# Patient Record
Sex: MALE | URBAN - METROPOLITAN AREA
[De-identification: names, ages, dates, MRNs, and addresses within clinical notes are randomized per-mention and may not be internally consistent; named-entity substitution may affect disease eponyms.]

---

## 2022-12-08 ENCOUNTER — OUT OF OFFICE VISIT (OUTPATIENT)
Dept: URBAN - METROPOLITAN AREA MEDICAL CENTER 27 | Facility: MEDICAL CENTER | Age: 57
End: 2022-12-08
Payer: COMMERCIAL

## 2022-12-08 DIAGNOSIS — K85.80 ACUTE VIRAL PANCREATITIS: ICD-10-CM

## 2022-12-08 PROCEDURE — G8427 DOCREV CUR MEDS BY ELIG CLIN: HCPCS | Performed by: INTERNAL MEDICINE

## 2022-12-08 PROCEDURE — 99222 1ST HOSP IP/OBS MODERATE 55: CPT | Performed by: INTERNAL MEDICINE

## 2022-12-09 ENCOUNTER — OUT OF OFFICE VISIT (OUTPATIENT)
Dept: URBAN - METROPOLITAN AREA MEDICAL CENTER 27 | Facility: MEDICAL CENTER | Age: 57
End: 2022-12-09
Payer: COMMERCIAL

## 2022-12-09 DIAGNOSIS — K85.80 ACUTE VIRAL PANCREATITIS: ICD-10-CM

## 2022-12-09 DIAGNOSIS — K80.20 ASYMPTOMATIC CHOLELITHIASIS: ICD-10-CM

## 2022-12-09 DIAGNOSIS — K76.0 FATTY (CHANGE OF) LIVER: ICD-10-CM

## 2022-12-09 PROCEDURE — 99232 SBSQ HOSP IP/OBS MODERATE 35: CPT | Performed by: INTERNAL MEDICINE

## 2024-06-05 ENCOUNTER — DASHBOARD ENCOUNTERS (OUTPATIENT)
Age: 59
End: 2024-06-05

## 2024-06-05 ENCOUNTER — OFFICE VISIT (OUTPATIENT)
Dept: URBAN - METROPOLITAN AREA CLINIC 12 | Facility: CLINIC | Age: 59
End: 2024-06-05
Payer: COMMERCIAL

## 2024-06-05 ENCOUNTER — LAB OUTSIDE AN ENCOUNTER (OUTPATIENT)
Dept: URBAN - METROPOLITAN AREA CLINIC 12 | Facility: CLINIC | Age: 59
End: 2024-06-05

## 2024-06-05 VITALS
SYSTOLIC BLOOD PRESSURE: 133 MMHG | HEART RATE: 84 BPM | BODY MASS INDEX: 25.03 KG/M2 | HEIGHT: 69 IN | WEIGHT: 169 LBS | DIASTOLIC BLOOD PRESSURE: 79 MMHG | TEMPERATURE: 98.1 F

## 2024-06-05 DIAGNOSIS — R63.4 WEIGHT LOSS: ICD-10-CM

## 2024-06-05 DIAGNOSIS — K85.90 PANCREATITIS: ICD-10-CM

## 2024-06-05 DIAGNOSIS — K58.0 IRRITABLE BOWEL SYNDROME WITH DIARRHEA: ICD-10-CM

## 2024-06-05 DIAGNOSIS — Z86.010 PERSONAL HISTORY OF COLONIC POLYPS: ICD-10-CM

## 2024-06-05 DIAGNOSIS — Z90.49 S/P CHOLECYSTECTOMY: ICD-10-CM

## 2024-06-05 DIAGNOSIS — R10.9 ABDOMINAL PAIN: ICD-10-CM

## 2024-06-05 DIAGNOSIS — R14.0 BLOATING: ICD-10-CM

## 2024-06-05 PROBLEM — 197125005: Status: ACTIVE | Noted: 2024-06-05

## 2024-06-05 PROBLEM — 428882003: Status: ACTIVE | Noted: 2024-06-05

## 2024-06-05 PROCEDURE — 99204 OFFICE O/P NEW MOD 45 MIN: CPT | Performed by: INTERNAL MEDICINE

## 2024-06-05 NOTE — HPI-TODAY'S VISIT:
The patient, Mr. Carter, presents with a history of pancreatitis and cholecystectomy in December 2022. He reports two main concerns: daily inconsistency in bowel movements and four to five episodes of pain since the surgery, which he describes as similar to acute pancreatitis. The most recent episode occurred in October, and a CT scan with contrast and blood work at that time were normal. The patient has a history of benign polyps found during a colonoscopy less than four years ago.  Mr. Carter describes his bowel movements as very inconsistent, with loose stools and multiple urges in the mornings. He reports that the first bowel movement is often formed, followed by looser stools. He denies any blood in the stool and states that a stool sample taken during an annual physical a few months ago showed no blood. The patient is currently taking Creon, but does not notice any improvement in symptoms.  The patient reports no ongoing abdominal pain and has lost 8 to 10 pounds over the past 18 months. He attributes the weight loss to eating less due to discomfort. There is no family history of pancreatic issues, colon polyps, or colon cancer. The patient has never been checked for celiac disease or other bowel issues. He is currently taking Crestor, Zetia, Lisinopril, and Amlodipine for other medical conditions.

## 2024-06-13 ENCOUNTER — LAB OUTSIDE AN ENCOUNTER (OUTPATIENT)
Dept: URBAN - METROPOLITAN AREA CLINIC 23 | Facility: CLINIC | Age: 59
End: 2024-06-13

## 2024-06-20 LAB
CALPROTECTIN, FECAL: 54
IMMUNOGLOBULIN A: 254
INTERPRETATION: (no result)
PANCREATIC ELASTASE, FECAL: 116
TISSUE TRANSGLUTAMINASE AB, IGA: <1

## 2024-06-21 ENCOUNTER — TELEPHONE ENCOUNTER (OUTPATIENT)
Dept: URBAN - METROPOLITAN AREA CLINIC 6 | Facility: CLINIC | Age: 59
End: 2024-06-21

## 2024-06-21 ENCOUNTER — LAB OUTSIDE AN ENCOUNTER (OUTPATIENT)
Dept: URBAN - METROPOLITAN AREA CLINIC 6 | Facility: CLINIC | Age: 59
End: 2024-06-21

## 2024-06-24 ENCOUNTER — TELEPHONE ENCOUNTER (OUTPATIENT)
Dept: URBAN - METROPOLITAN AREA CLINIC 6 | Facility: CLINIC | Age: 59
End: 2024-06-24

## 2024-06-24 RX ORDER — LORAZEPAM 0.5 MG/1
TAKE ONE TABLET HALF HOUR PRIOR TO MRI, OK TO REPEAT ONCE AS NEEDED TABLET ORAL ONCE A DAY
Qty: 2 | Refills: 0 | OUTPATIENT
Start: 2024-06-24

## 2024-06-26 ENCOUNTER — TELEPHONE ENCOUNTER (OUTPATIENT)
Dept: URBAN - METROPOLITAN AREA CLINIC 12 | Facility: CLINIC | Age: 59
End: 2024-06-26

## 2024-06-26 ENCOUNTER — LAB OUTSIDE AN ENCOUNTER (OUTPATIENT)
Dept: URBAN - METROPOLITAN AREA CLINIC 23 | Facility: CLINIC | Age: 59
End: 2024-06-26

## 2024-06-26 RX ORDER — PANCRELIPASE LIPASE, PANCRELIPASE AMYLASE, AND PANCRELIPASE PROTEASE 10500; 61500; 35500 [USP'U]/1; [USP'U]/1; [USP'U]/1
2 CAPSULES WITH MEALS AND ONE WITH SNACKS CAPSULE, DELAYED RELEASE ORAL DAILY
Qty: 720 | Refills: 3 | OUTPATIENT
Start: 2024-06-27

## 2024-07-09 ENCOUNTER — TELEPHONE ENCOUNTER (OUTPATIENT)
Dept: URBAN - METROPOLITAN AREA CLINIC 23 | Facility: CLINIC | Age: 59
End: 2024-07-09

## 2024-07-09 ENCOUNTER — TELEPHONE ENCOUNTER (OUTPATIENT)
Dept: URBAN - METROPOLITAN AREA CLINIC 12 | Facility: CLINIC | Age: 59
End: 2024-07-09

## 2024-07-11 ENCOUNTER — TELEPHONE ENCOUNTER (OUTPATIENT)
Dept: URBAN - METROPOLITAN AREA CLINIC 6 | Facility: CLINIC | Age: 59
End: 2024-07-11

## 2024-07-11 RX ORDER — PANCRELIPASE LIPASE, PANCRELIPASE AMYLASE, AND PANCRELIPASE PROTEASE 10500; 61500; 35500 [USP'U]/1; [USP'U]/1; [USP'U]/1
2 CAPSULES WITH MEALS AND ONE WITH SNACKS CAPSULE, DELAYED RELEASE ORAL DAILY
Qty: 720 | Refills: 3
Start: 2024-06-27

## 2024-07-15 ENCOUNTER — CLAIMS CREATED FROM THE CLAIM WINDOW (OUTPATIENT)
Dept: URBAN - METROPOLITAN AREA CLINIC 4 | Facility: CLINIC | Age: 59
End: 2024-07-15
Payer: COMMERCIAL

## 2024-07-15 ENCOUNTER — CLAIMS CREATED FROM THE CLAIM WINDOW (OUTPATIENT)
Dept: URBAN - METROPOLITAN AREA SURGERY CENTER 15 | Facility: SURGERY CENTER | Age: 59
End: 2024-07-15
Payer: COMMERCIAL

## 2024-07-15 DIAGNOSIS — D12.4 BENIGN NEOPLASM OF DESCENDING COLON: ICD-10-CM

## 2024-07-15 DIAGNOSIS — R19.7 ACUTE DIARRHEA: ICD-10-CM

## 2024-07-15 DIAGNOSIS — K63.89 OTHER SPECIFIED DISEASES OF INTESTINE: ICD-10-CM

## 2024-07-15 DIAGNOSIS — K57.30 DIVERTICULA OF COLON: ICD-10-CM

## 2024-07-15 DIAGNOSIS — D12.2 BENIGN NEOPLASM OF ASCENDING COLON: ICD-10-CM

## 2024-07-15 DIAGNOSIS — D12.4 ADENOMA OF DESCENDING COLON: ICD-10-CM

## 2024-07-15 DIAGNOSIS — D12.3 BENIGN NEOPLASM OF TRANSVERSE COLON: ICD-10-CM

## 2024-07-15 DIAGNOSIS — D12.3 ADENOMA OF TRANSVERSE COLON: ICD-10-CM

## 2024-07-15 DIAGNOSIS — K63.5 COLON POLYPS: ICD-10-CM

## 2024-07-15 DIAGNOSIS — D12.2 ADENOMA OF ASCENDING COLON: ICD-10-CM

## 2024-07-15 PROCEDURE — 00811 ANES LWR INTST NDSC NOS: CPT | Performed by: NURSE ANESTHETIST, CERTIFIED REGISTERED

## 2024-07-15 PROCEDURE — 45380 COLONOSCOPY AND BIOPSY: CPT | Performed by: INTERNAL MEDICINE

## 2024-07-15 PROCEDURE — 45385 COLONOSCOPY W/LESION REMOVAL: CPT | Performed by: INTERNAL MEDICINE

## 2024-07-15 PROCEDURE — 88305 TISSUE EXAM BY PATHOLOGIST: CPT | Performed by: PATHOLOGY

## 2024-07-15 RX ORDER — LORAZEPAM 0.5 MG/1
TAKE ONE TABLET HALF HOUR PRIOR TO MRI, OK TO REPEAT ONCE AS NEEDED TABLET ORAL ONCE A DAY
Qty: 2 | Refills: 0 | Status: ACTIVE | COMMUNITY
Start: 2024-06-24

## 2024-07-15 RX ORDER — PANCRELIPASE LIPASE, PANCRELIPASE AMYLASE, AND PANCRELIPASE PROTEASE 10500; 61500; 35500 [USP'U]/1; [USP'U]/1; [USP'U]/1
2 CAPSULES WITH MEALS AND ONE WITH SNACKS CAPSULE, DELAYED RELEASE ORAL DAILY
Qty: 720 | Refills: 3 | Status: ACTIVE | COMMUNITY
Start: 2024-06-27

## 2024-07-17 ENCOUNTER — TELEPHONE ENCOUNTER (OUTPATIENT)
Dept: URBAN - METROPOLITAN AREA CLINIC 12 | Facility: CLINIC | Age: 59
End: 2024-07-17

## 2024-07-17 ENCOUNTER — OFFICE VISIT (OUTPATIENT)
Dept: URBAN - METROPOLITAN AREA CLINIC 12 | Facility: CLINIC | Age: 59
End: 2024-07-17
Payer: COMMERCIAL

## 2024-07-17 VITALS
DIASTOLIC BLOOD PRESSURE: 78 MMHG | WEIGHT: 168 LBS | SYSTOLIC BLOOD PRESSURE: 124 MMHG | HEIGHT: 69 IN | BODY MASS INDEX: 24.88 KG/M2 | TEMPERATURE: 98.1 F | HEART RATE: 68 BPM

## 2024-07-17 DIAGNOSIS — K58.0 IRRITABLE BOWEL SYNDROME WITH DIARRHEA: ICD-10-CM

## 2024-07-17 DIAGNOSIS — Z90.49 S/P CHOLECYSTECTOMY: ICD-10-CM

## 2024-07-17 DIAGNOSIS — K86.89 PANCREATIC INSUFFICIENCY: ICD-10-CM

## 2024-07-17 DIAGNOSIS — R14.0 BLOATING: ICD-10-CM

## 2024-07-17 DIAGNOSIS — R63.4 WEIGHT LOSS: ICD-10-CM

## 2024-07-17 DIAGNOSIS — Z86.010 PERSONAL HISTORY OF COLONIC POLYPS: ICD-10-CM

## 2024-07-17 DIAGNOSIS — K85.90 PANCREATITIS: ICD-10-CM

## 2024-07-17 DIAGNOSIS — R10.9 ABDOMINAL PAIN: ICD-10-CM

## 2024-07-17 PROCEDURE — 99214 OFFICE O/P EST MOD 30 MIN: CPT | Performed by: INTERNAL MEDICINE

## 2024-07-17 RX ORDER — PANCRELIPASE LIPASE, PANCRELIPASE AMYLASE, AND PANCRELIPASE PROTEASE 10500; 61500; 35500 [USP'U]/1; [USP'U]/1; [USP'U]/1
2 CAPSULES WITH MEALS AND ONE WITH SNACKS CAPSULE, DELAYED RELEASE ORAL DAILY
Qty: 720 | Refills: 3
Start: 2024-06-27

## 2024-07-17 RX ORDER — LORAZEPAM 0.5 MG/1
TAKE ONE TABLET HALF HOUR PRIOR TO MRI, OK TO REPEAT ONCE AS NEEDED TABLET ORAL ONCE A DAY
Qty: 2 | Refills: 0 | Status: ON HOLD | COMMUNITY
Start: 2024-06-24

## 2024-07-17 RX ORDER — PANCRELIPASE LIPASE, PANCRELIPASE AMYLASE, AND PANCRELIPASE PROTEASE 10500; 61500; 35500 [USP'U]/1; [USP'U]/1; [USP'U]/1
2 CAPSULES WITH MEALS AND ONE WITH SNACKS CAPSULE, DELAYED RELEASE ORAL DAILY
Qty: 720 | Refills: 3 | Status: ACTIVE | COMMUNITY
Start: 2024-06-27

## 2024-07-17 NOTE — PREVIOUS WORKUP REVIEWED EXTERNAL MEDICAL RECORD
.ENDOSCOPIEScolonoscopy- 7/2024- 4 polyps in the colon removed, random biopsies taken . otherwise normal other than diverticulosis-pathology pending LABSIMAGESM[facility] - 6/24/2024          Liver: No mass in the included portions of the liver. No intrahepatic biliary ductal dilatation.        Gallbladder and biliary system: Gallbladder has been removed. Common bile duct measures up to 3 mm. There is no choledocholithiasis. Pancreas: There is no pancreas divisum or pancreas ductal distention. No pancreas mass or gross findings for pancreatitis currently. No definite sizable calcifications.  IMPRESSION: Cholecystectomy with normal caliber common bile duct and no choledocholithiasis.        No findings for general pancreatitis or focal pancreas mass.        Mild medical renal disease changes.

## 2024-07-17 NOTE — HPI-TODAY'S VISIT:
The patient reports improvement in gas and bowel movements, likely due to the psyllium fiber supplement. They are still adjusting the amount of fiber intake. Bowel movements occur two to four times a day and are better formed with the psyllium. However, the past three days have disrupted this pattern.  The patient experiences abdominal discomfort, especially during the morning half of the day. They have not yet received the pancreatic enzyme replacement due to difficulties in obtaining it from local pharmacies. The patient has some Creon at home, which was previously prescribed but stopped taking upon the doctor's advice. They took Creon for about 90 days but never got comfortable with a dosage and couldn't tell a significant difference between one or four capsules with a meal.  The patient's weight has decreased by approximately 10 pounds over the past three months. They attribute this to eating healthier and less. The patient has a history of pancreatitis and is currently being evaluated for pancreatic insufficiency and possible small growth or irregularity in the pancreas anatomy causing a blockage.  The patient's elastase levels were notably low, leading to the discussion of pancreatic insufficiency. The calprotectin levels were low borderline, and the antibodies for celiac disease were negative. The patient is awaiting pathology results from a recent colonoscopy.

## 2024-08-06 ENCOUNTER — OFFICE VISIT (OUTPATIENT)
Dept: URBAN - METROPOLITAN AREA MEDICAL CENTER 28 | Facility: MEDICAL CENTER | Age: 59
End: 2024-08-06
Payer: COMMERCIAL

## 2024-08-06 DIAGNOSIS — K86.89 OTHER SPECIFIED DISEASES OF PANCREAS: ICD-10-CM

## 2024-08-06 DIAGNOSIS — K31.7 FUNDIC GLAND POLYPOSIS OF STOMACH: ICD-10-CM

## 2024-08-06 DIAGNOSIS — K29.50 CHRONIC GASTRITIS: ICD-10-CM

## 2024-08-06 DIAGNOSIS — K20.80 OTHER ESOPHAGITIS: ICD-10-CM

## 2024-08-06 DIAGNOSIS — K31.89 OTHER DISEASES OF STOMACH AND DUODENUM: ICD-10-CM

## 2024-08-06 DIAGNOSIS — R10.13 DYSPEPSIA: ICD-10-CM

## 2024-08-06 PROCEDURE — 43239 EGD BIOPSY SINGLE/MULTIPLE: CPT | Performed by: INTERNAL MEDICINE

## 2024-08-06 PROCEDURE — 43259 EGD US EXAM DUODENUM/JEJUNUM: CPT | Performed by: INTERNAL MEDICINE

## 2024-08-06 RX ORDER — PANCRELIPASE LIPASE, PANCRELIPASE AMYLASE, AND PANCRELIPASE PROTEASE 10500; 61500; 35500 [USP'U]/1; [USP'U]/1; [USP'U]/1
2 CAPSULES WITH MEALS AND ONE WITH SNACKS CAPSULE, DELAYED RELEASE ORAL DAILY
Qty: 720 | Refills: 3 | Status: ACTIVE | COMMUNITY
Start: 2024-06-27

## 2024-08-06 RX ORDER — LORAZEPAM 0.5 MG/1
TAKE ONE TABLET HALF HOUR PRIOR TO MRI, OK TO REPEAT ONCE AS NEEDED TABLET ORAL ONCE A DAY
Qty: 2 | Refills: 0 | Status: ON HOLD | COMMUNITY
Start: 2024-06-24

## 2024-09-10 ENCOUNTER — TELEPHONE ENCOUNTER (OUTPATIENT)
Dept: URBAN - METROPOLITAN AREA CLINIC 12 | Facility: CLINIC | Age: 59
End: 2024-09-10

## 2024-09-10 RX ORDER — PANCRELIPASE 36000; 180000; 114000 [USP'U]/1; [USP'U]/1; [USP'U]/1
2 TABLETS WITH MEALS, AND 1 TABLET WITH SNACKS CAPSULE, DELAYED RELEASE PELLETS ORAL DAILY
Qty: 900 | Refills: 1 | OUTPATIENT
Start: 2024-09-10

## 2024-09-26 ENCOUNTER — OFFICE VISIT (OUTPATIENT)
Dept: URBAN - METROPOLITAN AREA CLINIC 23 | Facility: CLINIC | Age: 59
End: 2024-09-26

## 2024-09-26 VITALS
HEIGHT: 69 IN | HEART RATE: 64 BPM | SYSTOLIC BLOOD PRESSURE: 116 MMHG | BODY MASS INDEX: 25.03 KG/M2 | DIASTOLIC BLOOD PRESSURE: 71 MMHG | TEMPERATURE: 97.7 F | WEIGHT: 169 LBS

## 2024-09-26 RX ORDER — PANCRELIPASE LIPASE, PANCRELIPASE AMYLASE, AND PANCRELIPASE PROTEASE 149900; 37000; 97300 [USP'U]/1; [USP'U]/1; [USP'U]/1
TAKE 3 CAPSULES DAILY WITH MEALS AND ONE WITH SNACKS CAPSULE, DELAYED RELEASE ORAL DAILY
Qty: 990 | Refills: 3 | OUTPATIENT
Start: 2024-09-26

## 2024-09-26 RX ORDER — PANCRELIPASE LIPASE, PANCRELIPASE AMYLASE, AND PANCRELIPASE PROTEASE 10500; 61500; 35500 [USP'U]/1; [USP'U]/1; [USP'U]/1
2 CAPSULES WITH MEALS AND ONE WITH SNACKS CAPSULE, DELAYED RELEASE ORAL DAILY
Qty: 720 | Refills: 3 | Status: ACTIVE | COMMUNITY
Start: 2024-06-27

## 2024-09-26 RX ORDER — PANCRELIPASE 36000; 180000; 114000 [USP'U]/1; [USP'U]/1; [USP'U]/1
2 TABLETS WITH MEALS, AND 1 TABLET WITH SNACKS CAPSULE, DELAYED RELEASE PELLETS ORAL DAILY
Qty: 900 | Refills: 1 | Status: DISCONTINUED | COMMUNITY
Start: 2024-09-10

## 2024-09-26 NOTE — PREVIOUS WORKUP REVIEWED EXTERNAL MEDICAL RECORD
. ENDOSCOPIES egd 8/6/2024- no h pylori. F[facility] biopsies of esophagus negative for for eoe colonoscopy 7/2024- 4 polyps removed.  precancerous tubular adenoma.  random biopsies negative  eus 8/6/2024- Dr. Molina esophageal biopsies 2-5 sessile polyps in the stomachgastric biopsies EUS- chronic pancreatitis, appearance. no panc bipin dilation. no cbd dilationshows chronic pancreatitis changes likely due to etoh LABS  IMAGES MRI - 6/24/2024           Liver: No mass in the included portions of the liver. No intrahepatic  biliary ductal dilatation.         Gallbladder and biliary system: Gallbladder has been removed. Common bile  duct measures up to 3 mm. There is no choledocholithiasis.  Pancreas: There is no pancreas divisum or pancreas ductal distention. No  pancreas mass or gross findings for pancreatitis currently. No definite sizable  calcifications.   IMPRESSION: Cholecystectomy with normal caliber common bile duct and no  choledocholithiasis.          No findings for general pancreatitis or focal pancreas mass.          Mild medical renal disease changes.

## 2024-09-27 ENCOUNTER — TELEPHONE ENCOUNTER (OUTPATIENT)
Dept: URBAN - METROPOLITAN AREA CLINIC 23 | Facility: CLINIC | Age: 59
End: 2024-09-27

## 2024-10-30 ENCOUNTER — OFFICE VISIT (OUTPATIENT)
Dept: URBAN - METROPOLITAN AREA CLINIC 23 | Facility: CLINIC | Age: 59
End: 2024-10-30

## 2024-11-18 ENCOUNTER — OFFICE VISIT (OUTPATIENT)
Dept: URBAN - METROPOLITAN AREA CLINIC 23 | Facility: CLINIC | Age: 59
End: 2024-11-18

## 2024-11-19 ENCOUNTER — OFFICE VISIT (OUTPATIENT)
Dept: URBAN - METROPOLITAN AREA CLINIC 23 | Facility: CLINIC | Age: 59
End: 2024-11-19

## 2024-11-19 VITALS
HEIGHT: 69 IN | DIASTOLIC BLOOD PRESSURE: 76 MMHG | BODY MASS INDEX: 25.12 KG/M2 | TEMPERATURE: 97.7 F | HEART RATE: 60 BPM | SYSTOLIC BLOOD PRESSURE: 122 MMHG | WEIGHT: 169.6 LBS

## 2024-11-19 RX ORDER — DICYCLOMINE HYDROCHLORIDE 20 MG/1
1 TABLET TABLET ORAL
Qty: 90 | Refills: 3 | OUTPATIENT
Start: 2024-11-19 | End: 2025-11-14

## 2024-11-19 RX ORDER — PANCRELIPASE LIPASE, PANCRELIPASE AMYLASE, AND PANCRELIPASE PROTEASE 149900; 37000; 97300 [USP'U]/1; [USP'U]/1; [USP'U]/1
TAKE 3 CAPSULES DAILY WITH MEALS AND ONE WITH SNACKS CAPSULE, DELAYED RELEASE ORAL DAILY
Qty: 990 | Refills: 3 | Status: ACTIVE | COMMUNITY
Start: 2024-09-26

## 2024-11-19 RX ORDER — PANCRELIPASE LIPASE, PANCRELIPASE AMYLASE, AND PANCRELIPASE PROTEASE 149900; 37000; 97300 [USP'U]/1; [USP'U]/1; [USP'U]/1
AS DIRECTED CAPSULE, DELAYED RELEASE ORAL
Qty: 900 | Refills: 3
Start: 2024-09-26 | End: 2025-11-14

## 2024-11-19 RX ORDER — PANCRELIPASE LIPASE, PANCRELIPASE AMYLASE, AND PANCRELIPASE PROTEASE 10500; 61500; 35500 [USP'U]/1; [USP'U]/1; [USP'U]/1
2 CAPSULES WITH MEALS AND ONE WITH SNACKS CAPSULE, DELAYED RELEASE ORAL DAILY
Qty: 720 | Refills: 3 | Status: ACTIVE | COMMUNITY
Start: 2024-06-27

## 2024-11-20 ENCOUNTER — WEB ENCOUNTER (OUTPATIENT)
Dept: URBAN - METROPOLITAN AREA CLINIC 111 | Facility: CLINIC | Age: 59
End: 2024-11-20

## 2025-01-22 ENCOUNTER — TELEPHONE ENCOUNTER (OUTPATIENT)
Dept: URBAN - METROPOLITAN AREA CLINIC 23 | Facility: CLINIC | Age: 60
End: 2025-01-22

## 2025-01-22 RX ORDER — PANCRELIPASE LIPASE, PANCRELIPASE AMYLASE, AND PANCRELIPASE PROTEASE 149900; 37000; 97300 [USP'U]/1; [USP'U]/1; [USP'U]/1
AS DIRECTED CAPSULE, DELAYED RELEASE ORAL
OUTPATIENT
Start: 2024-09-26 | End: 2025-11-14

## 2025-01-22 RX ORDER — PANCRELIPASE 36000; 180000; 114000 [USP'U]/1; [USP'U]/1; [USP'U]/1
2 TABLETS WITH MEALS, AND 1 TABLET WITH SNACKS CAPSULE, DELAYED RELEASE PELLETS ORAL DAILY
Qty: 720 | Refills: 1 | OUTPATIENT
Start: 2025-01-23

## 2025-01-27 ENCOUNTER — TELEPHONE ENCOUNTER (OUTPATIENT)
Dept: URBAN - METROPOLITAN AREA CLINIC 23 | Facility: CLINIC | Age: 60
End: 2025-01-27

## 2025-03-20 ENCOUNTER — OFFICE VISIT (OUTPATIENT)
Dept: URBAN - METROPOLITAN AREA CLINIC 23 | Facility: CLINIC | Age: 60
End: 2025-03-20

## 2025-03-20 VITALS
BODY MASS INDEX: 26.22 KG/M2 | DIASTOLIC BLOOD PRESSURE: 85 MMHG | SYSTOLIC BLOOD PRESSURE: 131 MMHG | TEMPERATURE: 97.7 F | HEART RATE: 64 BPM | HEIGHT: 69 IN | WEIGHT: 177 LBS

## 2025-03-20 RX ORDER — DICYCLOMINE HYDROCHLORIDE 20 MG/1
1 TABLET TABLET ORAL
Qty: 90 | Refills: 3 | OUTPATIENT

## 2025-03-20 RX ORDER — PANCRELIPASE 36000; 180000; 114000 [USP'U]/1; [USP'U]/1; [USP'U]/1
4 TABLETS WITH MEALS, AND 2 TABLET WITH SNACKS CAPSULE, DELAYED RELEASE PELLETS ORAL
Qty: 1440 | Refills: 3
Start: 2025-01-23 | End: 2026-03-15

## 2025-03-20 RX ORDER — PANCRELIPASE 36000; 180000; 114000 [USP'U]/1; [USP'U]/1; [USP'U]/1
2 TABLETS WITH MEALS, AND 1 TABLET WITH SNACKS CAPSULE, DELAYED RELEASE PELLETS ORAL DAILY
Qty: 720 | Refills: 1 | Status: ACTIVE | COMMUNITY
Start: 2025-01-23

## 2025-03-20 RX ORDER — PANCRELIPASE LIPASE, PANCRELIPASE AMYLASE, AND PANCRELIPASE PROTEASE 10500; 61500; 35500 [USP'U]/1; [USP'U]/1; [USP'U]/1
2 CAPSULES WITH MEALS AND ONE WITH SNACKS CAPSULE, DELAYED RELEASE ORAL DAILY
Qty: 720 | Refills: 3 | Status: DISCONTINUED | COMMUNITY
Start: 2024-06-27

## 2025-03-20 RX ORDER — PANCRELIPASE LIPASE, PANCRELIPASE AMYLASE, AND PANCRELIPASE PROTEASE 10500; 61500; 35500 [USP'U]/1; [USP'U]/1; [USP'U]/1
2 CAPSULES WITH MEALS AND ONE WITH SNACKS CAPSULE, DELAYED RELEASE ORAL DAILY
OUTPATIENT
Start: 2024-06-27

## 2025-03-20 RX ORDER — DICYCLOMINE HYDROCHLORIDE 20 MG/1
1 TABLET TABLET ORAL
Qty: 90 | Refills: 3 | Status: ACTIVE | COMMUNITY
Start: 2024-11-19 | End: 2025-11-14

## 2025-03-20 NOTE — PREVIOUS WORKUP REVIEWED EXTERNAL MEDICAL RECORD
.ENDOSCOPIESegd 8/6/2024- no h pylori. FGP biopsies of esophagus negative for for eoecolonoscopy 7/2024- 4 polyps removed.  precancerous tubular adenoma.  random biopsies negativeeus 8/6/2024- Dr. Molina esophageal biopsies 2-5 sessile polyps in the stomachgastric biopsiesEUS- chronic pancreatitis, appearance. no panc bipin dilation. no cbd dilationshows chronic pancreatitis changes likely due to etohLABSIMAGESMRI - 6/24/2024          Liver: No mass in the included portions of the liver. No intrahepatic biliary ductal dilatation.        Gallbladder and biliary system: Gallbladder has been removed. Common bile duct measures up to 3 mm. There is no choledocholithiasis. Pancreas: There is no pancreas divisum or pancreas ductal distention. No pancreas mass or gross findings for pancreatitis currently. No definite sizable calcifications.  IMPRESSION: Cholecystectomy with normal caliber common bile duct and no choledocholithiasis.        No findings for general pancreatitis or focal pancreas mass.        Mild medical renal disease changes.

## 2025-04-29 ENCOUNTER — OFFICE VISIT (OUTPATIENT)
Dept: URBAN - METROPOLITAN AREA CLINIC 111 | Facility: CLINIC | Age: 60
End: 2025-04-29
Payer: COMMERCIAL

## 2025-04-29 ENCOUNTER — LAB OUTSIDE AN ENCOUNTER (OUTPATIENT)
Dept: URBAN - METROPOLITAN AREA CLINIC 111 | Facility: CLINIC | Age: 60
End: 2025-04-29

## 2025-04-29 DIAGNOSIS — Z86.0101 HISTORY OF ADENOMATOUS POLYP OF COLON: ICD-10-CM

## 2025-04-29 DIAGNOSIS — K58.1 IBS: ICD-10-CM

## 2025-04-29 DIAGNOSIS — R10.30 LOWER ABDOMINAL PAIN: ICD-10-CM

## 2025-04-29 DIAGNOSIS — K86.0 CHRONIC ALCOHOLIC PANCREATITIS: ICD-10-CM

## 2025-04-29 DIAGNOSIS — K86.81 EXOCRINE PANCREATIC INSUFFICIENCY: ICD-10-CM

## 2025-04-29 DIAGNOSIS — Z90.49 S/P CHOLECYSTECTOMY: ICD-10-CM

## 2025-04-29 DIAGNOSIS — R14.0 BLOATING AND GAS: ICD-10-CM

## 2025-04-29 PROCEDURE — 99215 OFFICE O/P EST HI 40 MIN: CPT | Performed by: PHYSICIAN ASSISTANT

## 2025-04-29 RX ORDER — PANCRELIPASE 36000; 180000; 114000 [USP'U]/1; [USP'U]/1; [USP'U]/1
4 TABLETS WITH MEALS, AND 2 TABLET WITH SNACKS CAPSULE, DELAYED RELEASE PELLETS ORAL
Qty: 1440 | Refills: 3 | Status: ACTIVE | COMMUNITY
Start: 2025-01-23 | End: 2026-03-15

## 2025-04-29 RX ORDER — DICYCLOMINE HYDROCHLORIDE 20 MG/1
1 TABLET TABLET ORAL
Qty: 90 | Refills: 3 | Status: ACTIVE | COMMUNITY

## 2025-04-29 NOTE — HPI-TODAY'S VISIT:
58 y/o male here with abdominal pain and bloating. Last seen on 3/20/25 by Dr. Meadows for EPI, chronic alcoholic pancreatitis, abdominal cramping, pancreatitis, s/p CCY, IBS-D, weight loss, and h/o adenomatous and serrated colon polyps. Pt was told to continue Creon. He was counseled on stopping alcohol use. He admitted to drinking a little alcohol still. He was told to continue Dicyclomine. He is d/t repeat colon in 2027. He had MRI in 6/2024 with CCY. No pancreatitis or lesion seen. He had EGD/EUS in 8/2024 by Dr. Hamilton with chronic pancreatitis. No PD or CBD dilation. He had polyps in the stomach. Colon in 7/2024 with 4 tubular adenomas.   He has not had any alcohol in 45 days. He has had very little alcohol since last Fall. He initially had pancreatitis in 2022 and had CCY at that time. He reports 10-12 alcoholic drinks per week before he cut down. He reports he has been taking 4 Creon before meals and 2 before snacks. He was previously taking Pancreaze which he liked better. He has stool 2-3 times daily. Stool can be formed initially but then may get loose. Overall stool is better since being on pancreatic enzymes.   He reports he has had abdominal "soreness" daily. He feels this more in lower abdomen. He has had this for several months. It can worsen after eating. He can also have bloating and gas. He states it is worse from 4 AM- 9 AM until he can stool. He has been taking milk thistle and turmeric for the last 6 months. He is eating a low fat diet. He has cut out a lot of fatty foods and meats. He has an allergy to Naproxen but states he can tolerate Ibuprofen.

## 2025-05-30 ENCOUNTER — WEB ENCOUNTER (OUTPATIENT)
Dept: URBAN - METROPOLITAN AREA CLINIC 23 | Facility: CLINIC | Age: 60
End: 2025-05-30

## 2025-06-06 ENCOUNTER — WEB ENCOUNTER (OUTPATIENT)
Dept: URBAN - METROPOLITAN AREA CLINIC 23 | Facility: CLINIC | Age: 60
End: 2025-06-06

## 2025-06-06 RX ORDER — PANCRELIPASE 36000; 180000; 114000 [USP'U]/1; [USP'U]/1; [USP'U]/1
4 TABLETS WITH MEALS, AND 2 TABLET WITH SNACKS CAPSULE, DELAYED RELEASE PELLETS ORAL
Qty: 1440 | Refills: 3
Start: 2025-01-23

## 2025-06-16 ENCOUNTER — WEB ENCOUNTER (OUTPATIENT)
Dept: URBAN - METROPOLITAN AREA CLINIC 12 | Facility: CLINIC | Age: 60
End: 2025-06-16

## 2025-06-16 RX ORDER — PANCRELIPASE 36000; 180000; 114000 [USP'U]/1; [USP'U]/1; [USP'U]/1
4 TABLETS WITH MEALS, AND 2 TABLET WITH SNACKS CAPSULE, DELAYED RELEASE PELLETS ORAL
Qty: 1440 | Refills: 3
Start: 2025-01-23

## 2025-06-17 ENCOUNTER — WEB ENCOUNTER (OUTPATIENT)
Dept: URBAN - METROPOLITAN AREA CLINIC 23 | Facility: CLINIC | Age: 60
End: 2025-06-17

## 2025-06-17 ENCOUNTER — WEB ENCOUNTER (OUTPATIENT)
Dept: URBAN - METROPOLITAN AREA CLINIC 12 | Facility: CLINIC | Age: 60
End: 2025-06-17

## 2025-06-17 RX ORDER — DICYCLOMINE HYDROCHLORIDE 20 MG/1
1 TABLET TABLET ORAL
Qty: 90 | Refills: 3

## 2025-06-22 ENCOUNTER — WEB ENCOUNTER (OUTPATIENT)
Dept: URBAN - METROPOLITAN AREA CLINIC 23 | Facility: CLINIC | Age: 60
End: 2025-06-22

## 2025-06-22 ENCOUNTER — WEB ENCOUNTER (OUTPATIENT)
Dept: URBAN - METROPOLITAN AREA CLINIC 12 | Facility: CLINIC | Age: 60
End: 2025-06-22

## 2025-06-22 RX ORDER — DICYCLOMINE HYDROCHLORIDE 20 MG/1
1 TABLET TABLET ORAL
Qty: 90 | Refills: 3

## 2025-06-25 ENCOUNTER — WEB ENCOUNTER (OUTPATIENT)
Dept: URBAN - METROPOLITAN AREA CLINIC 23 | Facility: CLINIC | Age: 60
End: 2025-06-25

## 2025-06-25 ENCOUNTER — TELEPHONE ENCOUNTER (OUTPATIENT)
Dept: URBAN - METROPOLITAN AREA CLINIC 111 | Facility: CLINIC | Age: 60
End: 2025-06-25

## 2025-06-26 ENCOUNTER — TELEPHONE ENCOUNTER (OUTPATIENT)
Dept: URBAN - METROPOLITAN AREA CLINIC 19 | Facility: CLINIC | Age: 60
End: 2025-06-26

## 2025-06-26 RX ORDER — DICYCLOMINE HYDROCHLORIDE 20 MG/1
1 TABLET TABLET ORAL
Qty: 270 TABLET | Refills: 3